# Patient Record
(demographics unavailable — no encounter records)

---

## 2018-04-03 DIAGNOSIS — J30.2 SEASONAL ALLERGIC RHINITIS, UNSPECIFIED CHRONICITY, UNSPECIFIED TRIGGER: Primary | ICD-10-CM

## 2018-04-03 RX ORDER — MONTELUKAST SODIUM 10 MG/1
TABLET ORAL
Qty: 30 TABLET | Refills: 3 | Status: SHIPPED | OUTPATIENT
Start: 2018-04-03

## 2018-06-04 RX ORDER — ALBUTEROL SULFATE 1.25 MG/3ML
3 SOLUTION RESPIRATORY (INHALATION)
Qty: 1 BOX | OUTPATIENT
Start: 2018-06-04

## 2018-06-04 RX ORDER — ALBUTEROL SULFATE 1.25 MG/3ML
3 SOLUTION RESPIRATORY (INHALATION)
COMMUNITY
End: 2018-07-17 | Stop reason: SDUPTHER

## 2018-07-17 RX ORDER — ALBUTEROL SULFATE 90 UG/1
2 AEROSOL, METERED RESPIRATORY (INHALATION) EVERY 6 HOURS PRN
Qty: 18 G | Refills: 0 | Status: SHIPPED | OUTPATIENT
Start: 2018-07-17 | End: 2019-07-17

## 2018-07-17 RX ORDER — ALBUTEROL SULFATE 1.25 MG/3ML
3 SOLUTION RESPIRATORY (INHALATION) 3 TIMES DAILY PRN
Qty: 1 BOX | Refills: 3 | Status: SHIPPED | OUTPATIENT
Start: 2018-07-17

## 2018-07-17 NOTE — TELEPHONE ENCOUNTER
----- Message from Carlita Asif sent at 7/17/2018 10:23 AM CDT -----  Contact: [phone]  Patient requesting a refill on albuterol (ACCUNEB) 1.25 mg/3 mL Nebu.    Patient will be using   [facility] [address] [place] [address] [address]  [address] [place] [address]  Phone: [phone] Fax: [phone]    Please call patient at [phone].     Thanks!

## 2018-07-17 NOTE — TELEPHONE ENCOUNTER
He needs rescue inhaler. Thanks    ---- Message from Zeinab Cannon sent at 7/17/2018  4:05 PM CDT -----  Contact: keysha  Is calling to get refill on his Rx Albuterol inhaler. No appointments until jUly 25, but he needed something sooner if possible. Please send refill to  [facility] [address] [place] [address] [address]  [address] [place] [id number]  Phone: [phone] Fax: [phone] (home)